# Patient Record
Sex: MALE | Race: WHITE | NOT HISPANIC OR LATINO
[De-identification: names, ages, dates, MRNs, and addresses within clinical notes are randomized per-mention and may not be internally consistent; named-entity substitution may affect disease eponyms.]

---

## 2018-01-22 PROBLEM — Z00.00 ENCOUNTER FOR PREVENTIVE HEALTH EXAMINATION: Status: ACTIVE | Noted: 2018-01-22

## 2018-01-23 ENCOUNTER — APPOINTMENT (OUTPATIENT)
Dept: UROLOGY | Facility: CLINIC | Age: 43
End: 2018-01-23
Payer: MEDICAID

## 2018-01-23 VITALS
BODY MASS INDEX: 21.7 KG/M2 | TEMPERATURE: 97.9 F | SYSTOLIC BLOOD PRESSURE: 123 MMHG | HEART RATE: 77 BPM | WEIGHT: 155 LBS | DIASTOLIC BLOOD PRESSURE: 83 MMHG | OXYGEN SATURATION: 95 % | HEIGHT: 71 IN

## 2018-01-23 DIAGNOSIS — Z87.828 PERSONAL HISTORY OF OTHER (HEALED) PHYSICAL INJURY AND TRAUMA: ICD-10-CM

## 2018-01-23 PROCEDURE — 81003 URINALYSIS AUTO W/O SCOPE: CPT | Mod: QW

## 2018-01-23 PROCEDURE — 99203 OFFICE O/P NEW LOW 30 MIN: CPT

## 2018-01-23 RX ORDER — ELECTROLYTES/DEXTROSE
10 SOLUTION, ORAL ORAL
Refills: 0 | Status: ACTIVE | COMMUNITY

## 2018-01-23 RX ORDER — PAROXETINE HYDROCHLORIDE 20 MG/1
20 TABLET, FILM COATED ORAL
Qty: 30 | Refills: 0 | Status: COMPLETED | COMMUNITY
Start: 2017-11-29 | End: 2018-01-23

## 2018-01-23 RX ORDER — IBUPROFEN 600 MG/1
600 TABLET, FILM COATED ORAL
Qty: 30 | Refills: 0 | Status: COMPLETED | COMMUNITY
Start: 2018-01-19 | End: 2018-01-23

## 2018-01-25 LAB
BILIRUB UR QL STRIP: NORMAL
CLARITY UR: CLEAR
COLLECTION METHOD: NORMAL
GLUCOSE UR-MCNC: NORMAL
HCG UR QL: 1 EU/DL
HGB UR QL STRIP.AUTO: NORMAL
KETONES UR-MCNC: NORMAL
LEUKOCYTE ESTERASE UR QL STRIP: NORMAL
NITRITE UR QL STRIP: NORMAL
PH UR STRIP: 6
PROT UR STRIP-MCNC: NORMAL
SP GR UR STRIP: 1.01

## 2018-01-30 ENCOUNTER — APPOINTMENT (OUTPATIENT)
Dept: UROLOGY | Facility: CLINIC | Age: 43
End: 2018-01-30
Payer: MEDICAID

## 2018-01-30 VITALS
TEMPERATURE: 98 F | BODY MASS INDEX: 21.7 KG/M2 | HEIGHT: 71 IN | SYSTOLIC BLOOD PRESSURE: 109 MMHG | WEIGHT: 155 LBS | HEART RATE: 74 BPM | DIASTOLIC BLOOD PRESSURE: 73 MMHG | OXYGEN SATURATION: 96 %

## 2018-01-30 DIAGNOSIS — N20.0 CALCULUS OF KIDNEY: ICD-10-CM

## 2018-01-30 LAB
BILIRUB UR QL STRIP: NORMAL
CLARITY UR: NORMAL
COLLECTION METHOD: NORMAL
GLUCOSE UR-MCNC: NORMAL
HCG UR QL: 0.2 EU/DL
HGB UR QL STRIP.AUTO: NORMAL
KETONES UR-MCNC: NORMAL
LEUKOCYTE ESTERASE UR QL STRIP: NORMAL
NITRITE UR QL STRIP: NORMAL
PH UR STRIP: 5.5
PROT UR STRIP-MCNC: NORMAL
SP GR UR STRIP: 1.01

## 2018-01-30 PROCEDURE — 81003 URINALYSIS AUTO W/O SCOPE: CPT | Mod: QW

## 2018-01-30 PROCEDURE — 99212 OFFICE O/P EST SF 10 MIN: CPT

## 2018-01-30 RX ORDER — TRAMADOL HYDROCHLORIDE 50 MG/1
50 TABLET, COATED ORAL
Qty: 20 | Refills: 0 | Status: COMPLETED | COMMUNITY
Start: 2018-01-20 | End: 2018-01-30

## 2018-01-30 RX ORDER — POLYETHYLENE GLYCOL 3350 17 G/17G
17 POWDER, FOR SOLUTION ORAL
Qty: 7 | Refills: 0 | Status: COMPLETED | COMMUNITY
Start: 2018-01-19 | End: 2018-01-30

## 2018-01-30 RX ORDER — TAMSULOSIN HYDROCHLORIDE 0.4 MG/1
0.4 CAPSULE ORAL
Qty: 30 | Refills: 0 | Status: COMPLETED | COMMUNITY
Start: 2018-01-23 | End: 2018-01-30

## 2018-01-30 RX ORDER — TRAMADOL HYDROCHLORIDE 50 MG/1
50 TABLET, COATED ORAL
Refills: 0 | Status: COMPLETED | COMMUNITY
End: 2018-01-30

## 2018-01-31 PROBLEM — N20.0 KIDNEY STONE ON RIGHT SIDE: Status: ACTIVE | Noted: 2018-01-23

## 2021-10-12 ENCOUNTER — EMERGENCY (EMERGENCY)
Facility: HOSPITAL | Age: 46
LOS: 1 days | End: 2021-10-12
Admitting: EMERGENCY MEDICINE
Payer: MEDICAID

## 2021-10-12 PROCEDURE — 74177 CT ABD & PELVIS W/CONTRAST: CPT | Mod: 26

## 2021-10-12 PROCEDURE — 71046 X-RAY EXAM CHEST 2 VIEWS: CPT | Mod: 26

## 2021-10-12 PROCEDURE — 99285 EMERGENCY DEPT VISIT HI MDM: CPT

## 2021-10-12 PROCEDURE — 70450 CT HEAD/BRAIN W/O DYE: CPT | Mod: 26

## 2021-10-12 PROCEDURE — 72125 CT NECK SPINE W/O DYE: CPT | Mod: 26

## 2022-12-20 ENCOUNTER — APPOINTMENT (OUTPATIENT)
Dept: CARDIOLOGY | Facility: CLINIC | Age: 47
End: 2022-12-20
Payer: COMMERCIAL

## 2022-12-20 VITALS
HEART RATE: 82 BPM | BODY MASS INDEX: 24.78 KG/M2 | DIASTOLIC BLOOD PRESSURE: 70 MMHG | OXYGEN SATURATION: 98 % | HEIGHT: 71 IN | SYSTOLIC BLOOD PRESSURE: 118 MMHG | RESPIRATION RATE: 16 BRPM | WEIGHT: 177 LBS

## 2022-12-20 VITALS — DIASTOLIC BLOOD PRESSURE: 66 MMHG | SYSTOLIC BLOOD PRESSURE: 112 MMHG

## 2022-12-20 DIAGNOSIS — Z87.01 PERSONAL HISTORY OF PNEUMONIA (RECURRENT): ICD-10-CM

## 2022-12-20 DIAGNOSIS — R53.83 OTHER FATIGUE: ICD-10-CM

## 2022-12-20 DIAGNOSIS — Z98.890 OTHER SPECIFIED POSTPROCEDURAL STATES: ICD-10-CM

## 2022-12-20 PROCEDURE — 99204 OFFICE O/P NEW MOD 45 MIN: CPT | Mod: 25

## 2022-12-20 PROCEDURE — 93000 ELECTROCARDIOGRAM COMPLETE: CPT

## 2022-12-20 PROCEDURE — 93246 EXT ECG>7D<15D RECORDING: CPT | Mod: 59

## 2022-12-20 RX ORDER — ASPIRIN ENTERIC COATED TABLETS 81 MG 81 MG/1
81 TABLET, DELAYED RELEASE ORAL
Qty: 90 | Refills: 3 | Status: ACTIVE | COMMUNITY

## 2022-12-20 RX ORDER — ATORVASTATIN CALCIUM 20 MG/1
20 TABLET, FILM COATED ORAL
Refills: 0 | Status: DISCONTINUED | COMMUNITY
End: 2022-12-20

## 2022-12-20 NOTE — DISCUSSION/SUMMARY
[FreeTextEntry1] : 1. Dyspnea: patient states he had echocardiogram done recently with PCP. Will attempt to obtain report. Recommend proBNP level. Given history of CAD, recommend repeating left heart catheterization to see if stenosis progressed, especially given the fact that patient has been off his medications.\par \par 2. CAD: plan as above. Back on rosuvastatin 10mg daily and Aspirin 81mg daily. Discussed with patient the lifelong indication for these medications and the likelihood of me increasing the rosuvastatin in the future as his goal LDL will be less than 70.\par \par 3. HLD: goal LDL less than 70. Discussed the familial nature to his condition. Patient has two daughters. Discussed with  and wife that his daughters should be screened as well. For now continue rosuvastatin 10mg daily.\par \par 4. Syncope: recommend testing as above. Recommend 14 Day Zio. Patient should remain out of work until workup is complete. \par \par Follow up after testing. [EKG obtained to assist in diagnosis and management of assessed problem(s)] : EKG obtained to assist in diagnosis and management of assessed problem(s)

## 2022-12-20 NOTE — REVIEW OF SYSTEMS
[Feeling Fatigued] : feeling fatigued [Negative] : Neurological [FreeTextEntry5] : see HPI [FreeTextEntry6] : see HPI

## 2022-12-20 NOTE — PHYSICAL EXAM
[Normal] : moves all extremities, no focal deficits, normal speech [de-identified] : No carotid bruits auscultated bilaterally

## 2022-12-20 NOTE — HISTORY OF PRESENT ILLNESS
[FreeTextEntry1] : 47 year old male, former smoke, PMHx of familial hypercholesterolemia, CAD, syncope presents for a cardiac evaluation. Patient states about two months ago he got pneumonia. Was treated. Since then he has dyspnea on exertion. Feels like he can't talk without getting dyspnea. No chest pain or pressure. \par \saranya Patient is a former patient of Dr. Back, [facility] [place]. He states he had two cardiac catheterizations. One in 2019 and most recently summer of 2022. Patient states he has a 60-70% stenosis in an unknown vessel. It was not intervened on. Patient states he didn't feel comfortable with his previous cardiologist and wants to get a second opinion, especially given the ongoing dyspnea. \par \par Patient also states he has had two syncopal episodes over the past 1 year. Feels it coming on. Feels tunnel vision and lightheadedness.\par \par Patient was taken off his statin and Aspirin by his previous cardiologist to see if his symptoms were secondary to side effects, however, he never felt better and never put back on. Only recently placed back on Aspirin 81mg daily and rosuvastatin 10mg daily by PCP when labs from December 2022, demonstrated LDL of 200.\par \par Patient is adopted and doesn't know his biological parents history.\par \par Patient has 2 daughter.\par \par Patient is a , but given his recent symptoms has been not working.

## 2023-01-06 ENCOUNTER — APPOINTMENT (OUTPATIENT)
Dept: CARDIOLOGY | Facility: CLINIC | Age: 48
End: 2023-01-06
Payer: COMMERCIAL

## 2023-01-06 VITALS
SYSTOLIC BLOOD PRESSURE: 118 MMHG | TEMPERATURE: 96.9 F | HEIGHT: 71 IN | OXYGEN SATURATION: 97 % | WEIGHT: 173 LBS | DIASTOLIC BLOOD PRESSURE: 70 MMHG | HEART RATE: 75 BPM | BODY MASS INDEX: 24.22 KG/M2

## 2023-01-06 DIAGNOSIS — Z87.891 PERSONAL HISTORY OF NICOTINE DEPENDENCE: ICD-10-CM

## 2023-01-06 PROCEDURE — 99214 OFFICE O/P EST MOD 30 MIN: CPT

## 2023-01-06 NOTE — HISTORY OF PRESENT ILLNESS
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  Found to have mid LAD myocardial bridge appears significant.  Moderate to severe diagonal disease which does not explain symptoms.  Upper normal LVEDP.  Radial access site well-healing.\par \par he is feeling better after beta blocker. \par \par Recommend to uptitrate lipid lowering therapy given CAD and .  Also medical treatment for mid LAD myocardial bridge with beta-blockade for symptomatic optimization.\par \par

## 2023-01-06 NOTE — DISCUSSION/SUMMARY
[FreeTextEntry1] : \par Found to have mid LAD myocardial bridge appears significant and could be a contributor to his symptoms.  Moderate to severe diagonal disease which does not explain symptoms.  Upper normal LVEDP. \par \par He is feeling much better after beta blocker. \par \par # Recommend to up-titrate lipid lowering therapy given CAD and . Also medical treatment for mid LAD myocardial bridge with beta-blockade for symptomatic optimization (if unable to tolerate then do amlodipine). Follow-up with primary cardiologist for further optimization.  ER precautions given to patient. \par \par Still out of work until work up complete per Dr. Dutta. Pending zio patch report. \par

## 2023-01-13 ENCOUNTER — APPOINTMENT (OUTPATIENT)
Dept: CARDIOLOGY | Facility: CLINIC | Age: 48
End: 2023-01-13
Payer: COMMERCIAL

## 2023-01-13 VITALS
HEIGHT: 71 IN | HEART RATE: 76 BPM | DIASTOLIC BLOOD PRESSURE: 82 MMHG | OXYGEN SATURATION: 97 % | SYSTOLIC BLOOD PRESSURE: 126 MMHG | WEIGHT: 175 LBS | BODY MASS INDEX: 24.5 KG/M2

## 2023-01-13 DIAGNOSIS — R06.02 SHORTNESS OF BREATH: ICD-10-CM

## 2023-01-13 DIAGNOSIS — R06.83 SNORING: ICD-10-CM

## 2023-01-13 PROCEDURE — 99214 OFFICE O/P EST MOD 30 MIN: CPT

## 2023-01-13 NOTE — CARDIOLOGY SUMMARY
[de-identified] : 12/8/2022, Sinus Bradycardia. [de-identified] : 12/20/2022, 14 Day Zio, NSR with three episodes of what appears to be Mobitz Type II (2 of episodes patient was sleeping, and the third episode patient might have been sleeping), 4 beat NSVT (no AV godfrey blocking agents during monitoring period) [de-identified] : 12/10/2022, LV EF 68%, no significant valvular disease [de-identified] : 1/3/2023, mid LAD bridge, moderate diagonal disease (60%)

## 2023-01-13 NOTE — DISCUSSION/SUMMARY
[FreeTextEntry1] : 1. Myocardial Bridge: evident on cardiac catheterization from 1/3/2023, but not likely playing a role in patient' symptoms. Recommend restarting Toprol X 25mg daily, however, as patient states he felt better on the medication. \par \par 2. CAD: 60% diagnonal lesion evident on cardiac catheterization from 1/3/2023. Recommend increasing rosuvastatin to 40mg daily. Continue Aspirin 81mg daily. Restart Toprol XL 25mg daily. Goal LDL less than 70. \par \par 3. HLD: goal LDL less than 70. Discussed the familial nature to his condition. Patient has two daughters. Discussed with  and wife that his daughters should be screened as well. Increase rosuvastatin to 40mg daily. Goal LDL less than 70 given underlying CAD. \par \par 4. Syncope: I don't believe the myocardial bridge or his CAD to be a culprit. Wore a 14 day Zio and it showed 3 separate Mobitz Type II blocks. This happened when patient was sleeping and he was not on AV godfrey blocking agents during the monitoring period. I don't think this has played a role in his syncopal episodes as well, however, patient does need to be on beta blocker therapy going forward, so I am going to repeat the monitor on beta blocker therapy. He will come in next Tuesday to have it placed. Also, given that he is a , I am referring him to EP for further evaluation. Wife also states he snores, so I am ordering a home sleep study.\par \par Follow up in 6 months. \par \par \par

## 2023-01-13 NOTE — HISTORY OF PRESENT ILLNESS
[FreeTextEntry1] : 47 year old male, former smoke, PMHx of familial hypercholesterolemia, CAD, syncope presents for a cardiac evaluation. Patient states about two months ago he got pneumonia. Was treated. Since then he has dyspnea on exertion. Feels like he can't talk without getting dyspnea. No chest pain or pressure. \par \saranya Patient is a former patient of Dr. Back, [facility] [place]. He states he had two cardiac catheterizations. One in 2019 and most recently summer of 2022. Patient states he has a 60-70% stenosis in an unknown vessel. It was not intervened on. Patient states he didn't feel comfortable with his previous cardiologist and wants to get a second opinion, especially given the ongoing dyspnea. \par \par Patient also states he has had two syncopal episodes over the past 1 year. Feels it coming on. Feels tunnel vision and lightheadedness.\par \par Patient was taken off his statin and Aspirin by his previous cardiologist to see if his symptoms were secondary to side effects, however, he never felt better and never put back on. Only recently placed back on Aspirin 81mg daily and rosuvastatin 10mg daily by PCP when labs from December 2022, demonstrated LDL of 200.\par \par Patient is adopted and doesn't know his biological parents history.\par \par Patient has 2 daughter.\par \par Patient is a , but given his recent symptoms has been not working.

## 2023-01-13 NOTE — PHYSICAL EXAM
[Normal] : moves all extremities, no focal deficits, normal speech [de-identified] : No carotid bruits auscultated bilaterally

## 2023-01-17 ENCOUNTER — APPOINTMENT (OUTPATIENT)
Dept: CARDIOLOGY | Facility: CLINIC | Age: 48
End: 2023-01-17
Payer: COMMERCIAL

## 2023-01-17 PROCEDURE — 93248 EXT ECG>7D<15D REV&INTERPJ: CPT

## 2023-01-31 ENCOUNTER — APPOINTMENT (OUTPATIENT)
Dept: CARDIOLOGY | Facility: CLINIC | Age: 48
End: 2023-01-31
Payer: COMMERCIAL

## 2023-01-31 PROCEDURE — 93244 EXT ECG>48HR<7D REV&INTERPJ: CPT

## 2023-02-03 ENCOUNTER — NON-APPOINTMENT (OUTPATIENT)
Age: 48
End: 2023-02-03

## 2023-02-22 ENCOUNTER — APPOINTMENT (OUTPATIENT)
Dept: ELECTROPHYSIOLOGY | Facility: CLINIC | Age: 48
End: 2023-02-22
Payer: COMMERCIAL

## 2023-02-22 ENCOUNTER — NON-APPOINTMENT (OUTPATIENT)
Age: 48
End: 2023-02-22

## 2023-02-22 PROCEDURE — 93000 ELECTROCARDIOGRAM COMPLETE: CPT

## 2023-02-22 PROCEDURE — 99204 OFFICE O/P NEW MOD 45 MIN: CPT | Mod: 25

## 2023-03-12 ENCOUNTER — TRANSCRIPTION ENCOUNTER (OUTPATIENT)
Age: 48
End: 2023-03-12

## 2023-03-12 ENCOUNTER — NON-APPOINTMENT (OUTPATIENT)
Age: 48
End: 2023-03-12

## 2023-03-13 ENCOUNTER — APPOINTMENT (OUTPATIENT)
Dept: PULMONOLOGY | Facility: CLINIC | Age: 48
End: 2023-03-13
Payer: COMMERCIAL

## 2023-03-13 VITALS
HEART RATE: 67 BPM | OXYGEN SATURATION: 97 % | BODY MASS INDEX: 25.8 KG/M2 | WEIGHT: 185 LBS | DIASTOLIC BLOOD PRESSURE: 72 MMHG | SYSTOLIC BLOOD PRESSURE: 118 MMHG | RESPIRATION RATE: 16 BRPM

## 2023-03-13 DIAGNOSIS — G47.33 OBSTRUCTIVE SLEEP APNEA (ADULT) (PEDIATRIC): ICD-10-CM

## 2023-03-13 DIAGNOSIS — G47.19 OTHER HYPERSOMNIA: ICD-10-CM

## 2023-03-13 PROCEDURE — 99204 OFFICE O/P NEW MOD 45 MIN: CPT

## 2023-03-13 RX ORDER — CYCLOBENZAPRINE HCL 5 MG
TABLET ORAL
Refills: 0 | Status: ACTIVE | COMMUNITY

## 2023-03-13 NOTE — HISTORY OF PRESENT ILLNESS
[TextBox_4] : FromThe patient is a 48-year-old male who has been complaining of his life of excessive daytime sleepiness.  He has been noted to snore.  He has a history of chest pains and has been undergoing cardiac work-up which showed myocardial bridging but also arrhythmias including pauses in the middle of the night.  He has been known to snore mildly.  He has very poor teeth and mostly has dentures.  He was sent for a sleep study recently and came to review that. [Obstructive Sleep Apnea] : obstructive sleep apnea [Home] : home [TextBox_100] : 1/23 [TextBox_108] : 6 [TextBox_112] : 99 [TextBox_116] : 88 [TextBox_120] : mod. snoring, hr 30-91 [ESS] : 11

## 2023-03-13 NOTE — PHYSICAL EXAM
[No Acute Distress] : no acute distress [Normal Oropharynx] : normal oropharynx [Low Lying Soft Palate] : low lying soft palate [IV] : Mallampati Class: IV [Normal Appearance] : normal appearance [No Neck Mass] : no neck mass [Normal Rate/Rhythm] : normal rate/rhythm [Normal S1, S2] : normal s1, s2 [No Murmurs] : no murmurs [No Resp Distress] : no resp distress [Clear to Auscultation Bilaterally] : clear to auscultation bilaterally [No Abnormalities] : no abnormalities [Benign] : benign [Normal Gait] : normal gait [No Clubbing] : no clubbing [No Cyanosis] : no cyanosis [No Edema] : no edema [FROM] : FROM [Normal Color/ Pigmentation] : normal color/ pigmentation [No Focal Deficits] : no focal deficits [Oriented x3] : oriented x3 [Normal Affect] : normal affect

## 2023-03-13 NOTE — ASSESSMENT
[FreeTextEntry1] : Patient shows mild obstructive sleep apnea compounded by excessive daytime sleepiness of a significant degree as well as nocturnal arrhythmias.  The home sleep study may be underestimating severity.  He is not a candidate for oral appliance therapy because of his dentition.  AutoPap at 4-12 cm H2O  was ordered for the patient and will be delivered to the patient's house.  Appropriate use parameters were discussed with the patient.  Patient will return after being on AutoPap for 6 to 8 weeks so that we can review compliance and efficacy and address any problems the patient may have with AutoPAP.

## 2023-03-15 ENCOUNTER — NON-APPOINTMENT (OUTPATIENT)
Age: 48
End: 2023-03-15

## 2023-03-16 NOTE — DISCUSSION/SUMMARY
[EKG obtained to assist in diagnosis and management of assessed problem(s)] : EKG obtained to assist in diagnosis and management of assessed problem(s) [FreeTextEntry1] : These episodes by his description appears to be vasovagal in nature seem to occur in situations if either increased stress or situations of discomfort such as neck pain.  Most likely he has a drop in blood pressure that then results in the syncope episode.  Because of the findings on the monitor and the nature of his occupation I would recommend the patient get an implantable loop monitor.\par \par I discussed the findings with the patient and his spouse.  I feel that this most likely is vasovagal but we would need to record his heartbeat during episodes and the best possible way would be an implantable loop monitor.  I discussed the procedure with the patient and he seems agreeable and we will schedule it.\par \par The ILR procedure, risk, benefits, alternatives ( including external monitors), monitoring limitations all explained. Patient understands and wants to proceed.\par

## 2023-03-16 NOTE — PHYSICAL EXAM
[No Acute Distress] : no acute distress [Normal Venous Pressure] : normal venous pressure [Normal S1, S2] : normal S1, S2 [Clear Lung Fields] : clear lung fields [Non Tender] : non-tender [No Edema] : no edema [No Focal Deficits] : no focal deficits [Alert and Oriented] : alert and oriented

## 2023-03-16 NOTE — HISTORY OF PRESENT ILLNESS
After Your Colonoscopy Instructions    DIET:  · Resume your usual diet as able  · Nausea may be expected for the first 24 to 48 hours.  Start eating a bland diet (toast, gelatin, 7-up, hot cereal, crackers, sherbet, broth and soup).  · Drink plenty of fluids (6-8 glasses of water a day).  · Avoid greasy or spicy foods for 24 hours.    ACTIVITY:  · Rest quietly at home for the remainder of the day. You may resume normal activities tomorrow.  · Do not do anything that requires coordination the day of the procedure, such as climbing ladders, using a sharp knife, operating machinery, driving.   · May resume driving and/or operating machinery in 24 hours  · May Shower tomorrow  · Follow for your well being safety (given & explained)  · Follow for your well being care after anesthesia or sedation (given & explained)       WHAT TO EXPECT:  · Mild abdominal discomfort, bloating and gas pains.  · A scant amount of rectal bleeding following a biopsy, polypectomy or if you have hemorrhoids is normal.  · Return of your usual bowel movements in 1-2 days.  · A tired feeling after the procedure due to the medications given to help you relax.       PROBLEMS TO WATCH FOR - NOTIFY YOUR SURGEON IF YOU HAVE ANY OF THESE SYMPTOMS:  · Severe abdominal pain or bloating.   · Signs of infection including chills or temperature over 101 degrees.  · Large amounts of bright red rectal bleeding, blood clots or black colored stool.  · Vomiting blood or black colored liquid.    FOLLOW -UP:  · If a specimen was taken, please allow at least 7-10  business days for pathology results.  · Someone will either call or send a letter with your pathology results.      If you have not received a letter or phone call, or have any questions or concerns please call DR. Howe [phone]                                 After Your EGD (Esophagogastroduodenoscopy) Instructions    DIET:  · Resume your usual diet as able  · Nausea may be  expected for the first 24 to 48 hours.  Start eating a bland diet (toast, gelatin, 7-up, hot cereal, crackers, sherbet, broth and soup).  · Drink plenty of fluids (6-8 glasses of water a day).  · Avoid greasy or spicy foods for 24 hours.    ACTIVITY:  · Rest quietly at home for the remainder of the day. You may resume normal activities tomorrow.  · Do not do anything that requires coordination the day of the procedure, such as climbing ladders, using a sharp knife, operating machinery, driving.  · May resume driving and/or operating machinery in 24 hours  · May shower tomorrow   · Follow post anesthesia sedation information sheet.(given and explained)  · Follow For Your Well Being Safety (given and explained)       WHAT TO EXPECT:  · Mild abdominal discomfort, bloating and gas pains.  · Mild throat soreness.  Warm salt-water gargle or lozenges may relieve your discomfort.  · Return of your usual bowel movements in 1-2 days.  · A tired feeling after the procedure due to the medications given to help you relax.       PROBLEMS TO WATCH FOR - NOTIFY YOUR SURGEON IF YOU HAVE ANY OF THESE SYMPTOMS:  · Severe abdominal pain or bloating.  · Difficulty swallowing or breathing  · Neck swelling  · Excessive pain   · Signs of infection including chills or temperature over 101 degrees..  · Large amounts of bright red rectal bleeding, blood clots or black colored stool.  · Nausea or Vomiting of blood or black colored liquid    FOLLOW -UP:  · Specimens were taken, please allow at least 7-10  business days for pathology results.  · Someone will either call or send a letter with your pathology results.      If you have not received a letter or phone call, or have any question or concerns,  please call DR. Howe [phone]    Patient/Family given For Your Well-Being Teaching Sheet:       x     Partners in Safety                        x    Care After Anesthesia/Sedation                   [FreeTextEntry1] : Patient is a 48-year-old man who was referred by his cardiologist for evaluation because of prior syncope and bradycardia noted on his monitoring.  He was accompanied by his spouse.\par \par He has a history of familial hypercholesterolemia.  Patient had previous cardiac catheterization at [facility] in 2019 ,2022\par \par Patient had another cardiac catheterization at [facility] on 1/3/2023 that showed minor irregularities in the left main, 60% stenosis of the first diagonal circumflex minor irregularities and RCA minor irregularities circumflex\par \par The patient had an echocardiogram performed 12/2020 that showed preserved left ventricular function normal left atrial size normal right ventricular systolic function as well normal wall motion and thickness no significant valvular abnormality.\par \par He had a Zio patch monitor performed for 3 days starting 1/17/2023 that showed sinus rhythm with short episodes 4 beats of ventricular rhythm at 130 bpm, occasional APC, PVC, sinus tachycardia 132 bpm\par \par He had another monitor that was performed for 14 days 12/20/2022: It showed sinus rhythm, 4 beats of NSVT, second-degree AV block and type I AV block -seem to have occurred during his sleep an hour.\par \par The patient has had about 4 syncope episode in 2022.\par \par He has had a prior history of sleep apnea and repeat tests pending.\par \par He works as a \par \par He has had prior cervical, knee and back surgery.  He has had recent pneumonia as well.\par \par Description of the previous syncope episode : Last episode occurred on a very strict stressful situation with family issues involving death in the family.  He felt pressure in his chest felt dizzy and then passed out.  He went to the hospital.  The episode before that he was at the physician's office doing blood test when he had his episode.\par \par

## 2023-03-16 NOTE — REVIEW OF SYSTEMS
[Headache] : no headache [Blurry Vision] : no blurred vision [SOB] : no shortness of breath [Dyspnea on exertion] : not dyspnea during exertion [Chest Discomfort] : no chest discomfort [Palpitations] : no palpitations [Syncope] : syncope [Cough] : no cough [Abdominal Pain] : no abdominal pain [Dizziness] : no dizziness [Under Stress] : not under stress [Easy Bleeding] : no tendency for easy bleeding

## 2023-03-20 ENCOUNTER — APPOINTMENT (OUTPATIENT)
Dept: CARDIOLOGY | Facility: CLINIC | Age: 48
End: 2023-03-20
Payer: COMMERCIAL

## 2023-03-20 PROCEDURE — ZZZZZ: CPT

## 2023-04-07 ENCOUNTER — APPOINTMENT (OUTPATIENT)
Dept: CARDIOLOGY | Facility: CLINIC | Age: 48
End: 2023-04-07
Payer: COMMERCIAL

## 2023-04-07 VITALS
OXYGEN SATURATION: 97 % | WEIGHT: 180 LBS | BODY MASS INDEX: 25.2 KG/M2 | DIASTOLIC BLOOD PRESSURE: 62 MMHG | HEART RATE: 66 BPM | HEIGHT: 71 IN | SYSTOLIC BLOOD PRESSURE: 114 MMHG

## 2023-04-07 DIAGNOSIS — R55 SYNCOPE AND COLLAPSE: ICD-10-CM

## 2023-04-07 DIAGNOSIS — Q24.5 MALFORMATION OF CORONARY VESSELS: ICD-10-CM

## 2023-04-07 PROCEDURE — 99214 OFFICE O/P EST MOD 30 MIN: CPT

## 2023-04-07 RX ORDER — DIPHENHYDRAMINE HCL, IBUPROFEN 25; 200 MG/1; MG/1
CAPSULE, LIQUID FILLED ORAL
Refills: 0 | Status: ACTIVE | COMMUNITY

## 2023-04-07 NOTE — PHYSICAL EXAM
[Normal] : moves all extremities, no focal deficits, normal speech [de-identified] : No carotid bruits auscultated bilaterally

## 2023-04-07 NOTE — CARDIOLOGY SUMMARY
[de-identified] : 12/8/2022, Sinus Bradycardia. [de-identified] : 12/20/2022, 14 Day Zio, NSR with three episodes of what appears to be Mobitz Type II (2 of episodes patient was sleeping, and the third episode patient might have been sleeping), 4 beat NSVT (no AV godfrey blocking agents during monitoring period) [de-identified] : 12/10/2022, LV EF 68%, no significant valvular disease [de-identified] : 1/3/2023, mid LAD bridge, moderate diagonal disease (60%)

## 2023-04-07 NOTE — DISCUSSION/SUMMARY
[FreeTextEntry1] : 1. Myocardial Bridge: evident on cardiac catheterization from 1/3/2023, but not likely playing a role in patient' symptoms. Recommend continuing Toprol X 25mg daily.\par \par 2. CAD: 60% diagnonal lesion evident on cardiac catheterization from 1/3/2023. Recommend increasing rosuvastatin to 40mg daily. Continue Aspirin 81mg daily. Continue Toprol XL 25mg daily. Goal LDL less than 70. \par \par 3. HLD: goal LDL less than 70. Discussed the familial nature to his condition. Patient has two daughters. Discussed with  and wife that his daughters should be screened as well. Continue rosuvastatin 40mg daily. Goal LDL less than 70 given underlying CAD. \par \par 4. Syncope: I don't believe the myocardial bridge or his CAD to be a culprit. Wore a 14 day Zio and it showed 3 separate Mobitz Type I blocks. This happened when patient was sleeping and he was not on AV godfrey blocking agents during the monitoring period. I don't think this has played a role in his syncopal episodes as well, however, patient does need to be on beta blocker therapy going forward, so I referred him to EP for further evaluation. Since seeing me last patient saw Dr. Fisher in February 2023. He thought the syncopal episodes were vagally mediated, but given patient's occupation he recommended an ILR. Insurance denied it and recommended another Zio monitor which patient just mailed in so results are not available.\par \par Follow up in 6 months. \par \par \par

## 2023-04-07 NOTE — HISTORY OF PRESENT ILLNESS
[FreeTextEntry1] : 48 year old male, former smoke, PMHx of familial hypercholesterolemia, CAD, syncope presents for a cardiac evaluation. Patient states about two months ago he got pneumonia. Was treated. Since then he has dyspnea on exertion. Feels like he can't talk without getting dyspnea. No chest pain or pressure. \par \par Patient is a former patient of [place], [facility] [place] He states he had two cardiac catheterizations. One in 2019 and most recently summer of 2022. Patient states he has a 60-70% stenosis in an unknown vessel. It was not intervened on. Patient states he didn't feel comfortable with his previous cardiologist and wants to get a second opinion, especially given the ongoing dyspnea. \par \par Patient also states he has had two syncopal episodes over the past 1 year. Feels it coming on. Feels tunnel vision and lightheadedness.\par \par Patient was taken off his statin and Aspirin by his previous cardiologist to see if his symptoms were secondary to side effects, however, he never felt better and never put back on. Only recently placed back on Aspirin 81mg daily and rosuvastatin 10mg daily by PCP when labs from December 2022, demonstrated LDL of 200.\par \par Patient is adopted and doesn't know his biological parents history.\par \par Patient has 2 daughter.\par \par Patient is a , but given his recent symptoms has been not working.\par \par Current Health Status:\par Since seeing me last patient saw Dr. Fisher in February 2023. He thought the syncopal episodes were vagally mediated, but given patient's occupation he recommended an ILR. Insurance denied it and recommended another Zio monitor which patient just mailed in so results are not available. Patient also saw Dr. Rodarte and was prescribed AutoPAP, which he has not obtained yet. Patient is overall feeling well. He has no chest pain or dyspnea. No more syncope. Still with fatigue throughout the day.

## 2023-04-11 ENCOUNTER — APPOINTMENT (OUTPATIENT)
Dept: CARDIOLOGY | Facility: CLINIC | Age: 48
End: 2023-04-11
Payer: COMMERCIAL

## 2023-04-11 PROCEDURE — 93248 EXT ECG>7D<15D REV&INTERPJ: CPT

## 2023-04-12 ENCOUNTER — NON-APPOINTMENT (OUTPATIENT)
Age: 48
End: 2023-04-12

## 2023-04-20 ENCOUNTER — NON-APPOINTMENT (OUTPATIENT)
Age: 48
End: 2023-04-20

## 2023-05-10 ENCOUNTER — APPOINTMENT (OUTPATIENT)
Dept: CARDIOLOGY | Facility: CLINIC | Age: 48
End: 2023-05-10
Payer: COMMERCIAL

## 2023-05-10 VITALS
HEART RATE: 71 BPM | OXYGEN SATURATION: 97 % | BODY MASS INDEX: 25.9 KG/M2 | WEIGHT: 185 LBS | HEIGHT: 71 IN | SYSTOLIC BLOOD PRESSURE: 114 MMHG | DIASTOLIC BLOOD PRESSURE: 68 MMHG

## 2023-05-10 DIAGNOSIS — Z45.09 ENCOUNTER FOR ADJUSTMENT AND MANAGEMENT OF OTHER CARDIAC DEVICE: ICD-10-CM

## 2023-05-10 PROCEDURE — 93285 PRGRMG DEV EVAL SCRMS IP: CPT

## 2023-05-10 RX ORDER — ROSUVASTATIN CALCIUM 40 MG/1
40 TABLET, FILM COATED ORAL DAILY
Qty: 90 | Refills: 3 | Status: ACTIVE | COMMUNITY
Start: 1900-01-01 | End: 1900-01-01

## 2023-05-10 NOTE — PROCEDURE
[de-identified] : Battery status good [de-identified] : S/p ILR implant. Site healing well without s/s of infection.\par \par No events on device.\par \par PVC burden 0.1%\par \par Patient requesting clearance to return to work. He has been scheduled to see Dr. Dutta next available to be seen for a hospital follow up.\par \par Toprol and statin refilled. Patient having upcoming labs with PCP this week.\par \par Device confirmed in [employer] 5/10/23.\par \par F/U: Monthly DRCs and in office DVC 1 year.\par Discussed red flag symptoms, which would warrant sooner or emergent medical evaluation.\par Any questions and concerns were addressed and resolved.\par \par Sincerely,\par Dafne Enrique [facility]\par Patient's history, testing, and plan was reviewed with supervising physician, Dr. Patrick Valentino.

## 2023-05-17 ENCOUNTER — APPOINTMENT (OUTPATIENT)
Dept: CARDIOLOGY | Facility: CLINIC | Age: 48
End: 2023-05-17

## 2023-05-23 ENCOUNTER — APPOINTMENT (OUTPATIENT)
Dept: CARDIOLOGY | Facility: CLINIC | Age: 48
End: 2023-05-23

## 2023-05-25 ENCOUNTER — NON-APPOINTMENT (OUTPATIENT)
Age: 48
End: 2023-05-25

## 2023-05-25 ENCOUNTER — APPOINTMENT (OUTPATIENT)
Dept: CARDIOLOGY | Facility: CLINIC | Age: 48
End: 2023-05-25
Payer: COMMERCIAL

## 2023-05-25 PROCEDURE — G2066: CPT

## 2023-05-25 PROCEDURE — 93298 REM INTERROG DEV EVAL SCRMS: CPT

## 2023-06-29 ENCOUNTER — APPOINTMENT (OUTPATIENT)
Dept: CARDIOLOGY | Facility: CLINIC | Age: 48
End: 2023-06-29
Payer: COMMERCIAL

## 2023-06-29 ENCOUNTER — NON-APPOINTMENT (OUTPATIENT)
Age: 48
End: 2023-06-29

## 2023-06-29 PROCEDURE — G2066: CPT

## 2023-06-29 PROCEDURE — 93298 REM INTERROG DEV EVAL SCRMS: CPT

## 2023-08-02 ENCOUNTER — NON-APPOINTMENT (OUTPATIENT)
Age: 48
End: 2023-08-02

## 2023-08-02 ENCOUNTER — APPOINTMENT (OUTPATIENT)
Dept: CARDIOLOGY | Facility: CLINIC | Age: 48
End: 2023-08-02
Payer: COMMERCIAL

## 2023-08-02 PROCEDURE — 93298 REM INTERROG DEV EVAL SCRMS: CPT

## 2023-08-02 PROCEDURE — G2066: CPT

## 2023-09-06 ENCOUNTER — NON-APPOINTMENT (OUTPATIENT)
Age: 48
End: 2023-09-06

## 2023-09-06 ENCOUNTER — APPOINTMENT (OUTPATIENT)
Dept: CARDIOLOGY | Facility: CLINIC | Age: 48
End: 2023-09-06
Payer: COMMERCIAL

## 2023-09-06 PROCEDURE — G2066: CPT

## 2023-09-06 PROCEDURE — 93298 REM INTERROG DEV EVAL SCRMS: CPT

## 2023-10-09 ENCOUNTER — APPOINTMENT (OUTPATIENT)
Dept: CARDIOLOGY | Facility: CLINIC | Age: 48
End: 2023-10-09

## 2023-10-11 ENCOUNTER — NON-APPOINTMENT (OUTPATIENT)
Age: 48
End: 2023-10-11

## 2023-10-11 ENCOUNTER — APPOINTMENT (OUTPATIENT)
Dept: CARDIOLOGY | Facility: CLINIC | Age: 48
End: 2023-10-11
Payer: COMMERCIAL

## 2023-10-12 PROCEDURE — 93298 REM INTERROG DEV EVAL SCRMS: CPT

## 2023-10-12 PROCEDURE — G2066: CPT

## 2023-11-15 ENCOUNTER — APPOINTMENT (OUTPATIENT)
Dept: CARDIOLOGY | Facility: CLINIC | Age: 48
End: 2023-11-15
Payer: COMMERCIAL

## 2023-11-15 ENCOUNTER — NON-APPOINTMENT (OUTPATIENT)
Age: 48
End: 2023-11-15

## 2023-11-16 PROCEDURE — G2066: CPT

## 2023-11-16 PROCEDURE — 93298 REM INTERROG DEV EVAL SCRMS: CPT

## 2023-12-19 ENCOUNTER — NON-APPOINTMENT (OUTPATIENT)
Age: 48
End: 2023-12-19

## 2023-12-19 ENCOUNTER — APPOINTMENT (OUTPATIENT)
Dept: CARDIOLOGY | Facility: CLINIC | Age: 48
End: 2023-12-19
Payer: COMMERCIAL

## 2023-12-20 PROCEDURE — 93298 REM INTERROG DEV EVAL SCRMS: CPT

## 2023-12-20 PROCEDURE — G2066: CPT

## 2024-01-16 ENCOUNTER — NON-APPOINTMENT (OUTPATIENT)
Age: 49
End: 2024-01-16

## 2024-01-23 ENCOUNTER — APPOINTMENT (OUTPATIENT)
Dept: CARDIOLOGY | Facility: CLINIC | Age: 49
End: 2024-01-23
Payer: COMMERCIAL

## 2024-01-23 ENCOUNTER — NON-APPOINTMENT (OUTPATIENT)
Age: 49
End: 2024-01-23

## 2024-01-23 PROCEDURE — 93298 REM INTERROG DEV EVAL SCRMS: CPT

## 2024-02-27 ENCOUNTER — APPOINTMENT (OUTPATIENT)
Dept: CARDIOLOGY | Facility: CLINIC | Age: 49
End: 2024-02-27
Payer: COMMERCIAL

## 2024-02-27 ENCOUNTER — NON-APPOINTMENT (OUTPATIENT)
Age: 49
End: 2024-02-27

## 2024-02-27 PROCEDURE — 93298 REM INTERROG DEV EVAL SCRMS: CPT

## 2024-04-01 ENCOUNTER — APPOINTMENT (OUTPATIENT)
Dept: CARDIOLOGY | Facility: CLINIC | Age: 49
End: 2024-04-01
Payer: COMMERCIAL

## 2024-04-01 ENCOUNTER — NON-APPOINTMENT (OUTPATIENT)
Age: 49
End: 2024-04-01

## 2024-04-01 PROCEDURE — 93298 REM INTERROG DEV EVAL SCRMS: CPT

## 2024-05-06 ENCOUNTER — APPOINTMENT (OUTPATIENT)
Dept: CARDIOLOGY | Facility: CLINIC | Age: 49
End: 2024-05-06
Payer: COMMERCIAL

## 2024-05-06 ENCOUNTER — NON-APPOINTMENT (OUTPATIENT)
Age: 49
End: 2024-05-06

## 2024-05-06 PROCEDURE — 93298 REM INTERROG DEV EVAL SCRMS: CPT

## 2024-05-14 ENCOUNTER — APPOINTMENT (OUTPATIENT)
Dept: NEUROSURGERY | Facility: CLINIC | Age: 49
End: 2024-05-14
Payer: COMMERCIAL

## 2024-05-14 DIAGNOSIS — M48.02 SPINAL STENOSIS, CERVICAL REGION: ICD-10-CM

## 2024-05-14 DIAGNOSIS — Z98.1 ARTHRODESIS STATUS: ICD-10-CM

## 2024-05-14 DIAGNOSIS — R29.898 OTHER SYMPTOMS AND SIGNS INVOLVING THE MUSCULOSKELETAL SYSTEM: ICD-10-CM

## 2024-05-14 PROCEDURE — 99204 OFFICE O/P NEW MOD 45 MIN: CPT

## 2024-05-14 NOTE — DATA REVIEWED
[de-identified] : And CT scan of the was reviewed of the cervical spine ([facility] [place], disc returned to the patient) - evidence of previous C6-7 ACDF; multilevel degenerative changes with foraminal narrowing at C5-6 and C7-T1.

## 2024-05-14 NOTE — HISTORY OF PRESENT ILLNESS
[FreeTextEntry1] : 49-year-old male presents to the neurosurgery office accompanied by his wife for an initial office visit for evaluation of neck pain and upper extremity radicular symptoms.  The patient reports previous history with Dr. Liz schafer in 2010 where he underwent elective lumbar fusion.  The patient reports that he later underwent surgery (ACDF C6-7) by Dr. Wood in 2021 which was performed on an emergent basis.  The patient has positive seizure history and is currently seeing Dr. Vidales who has the patient on lamotrigine and reports not having a seizure since June 2023.  He is here today for evaluation of his cervical spine and does have CT and MRI imaging of his cervical spine available for review.  He does have issues with coordination and balance issues and reports upper extremity weakness with noted changes in his  strength.  The patient is right-hand dominant.  He has undergone conservative treatment with physical therapy but has not gone to pain management recently.  The patient had a recent fall down the stairs with increased pain and upper extremity radicular symptoms warranting a trip to [facility] where [place] and MRI imaging was obtained and reviewed.  The patient was referred back to Dr. Wood's office, however this doctor does not take his insurance.

## 2024-05-14 NOTE — ASSESSMENT
[FreeTextEntry1] : Discussed the history, physical examination findings, and recent MRI and CT imaging with the patient and his wife with all questions answered.  After review of the imaging along with the neurosurgery attending, there is no role for acute neurosurgical intervention for the noted spine findings.  The patient is encouraged to follow-up with his primary care doctor and neurology office.  A pain management referral has been provided.  Discussed that the patient may follow-up with our office as needed.

## 2024-05-14 NOTE — REASON FOR VISIT
[Follow-Up: _____] : a [unfilled] follow-up visit [FreeTextEntry1] : Pt 48 y/o M presents in office for a NPA,no other complaints

## 2024-05-14 NOTE — PHYSICAL EXAM
[General Appearance - In No Acute Distress] : in no acute distress [General Appearance - Well Nourished] : well nourished [General Appearance - Well Developed] : well developed [] : normal voice and communication [Oriented To Time, Place, And Person] : oriented to person, place, and time [Impaired Insight] : insight and judgment were intact [Affect] : the affect was normal [Mood] : the mood was normal [Memory Recent] : recent memory was not impaired [Memory Remote] : remote memory was not impaired [Person] : oriented to person [Place] : oriented to place [Time] : oriented to time [Limited Balance] : the patient's balance was impaired [Tremor] : a tremor present [FreeTextEntry1] : The patient ambulates unassisted

## 2024-05-28 ENCOUNTER — APPOINTMENT (OUTPATIENT)
Dept: CARDIOLOGY | Facility: CLINIC | Age: 49
End: 2024-05-28

## 2024-06-10 ENCOUNTER — NON-APPOINTMENT (OUTPATIENT)
Age: 49
End: 2024-06-10

## 2024-06-10 ENCOUNTER — APPOINTMENT (OUTPATIENT)
Dept: CARDIOLOGY | Facility: CLINIC | Age: 49
End: 2024-06-10
Payer: COMMERCIAL

## 2024-06-10 PROCEDURE — 93298 REM INTERROG DEV EVAL SCRMS: CPT

## 2024-06-12 ENCOUNTER — APPOINTMENT (OUTPATIENT)
Dept: CARDIOLOGY | Facility: CLINIC | Age: 49
End: 2024-06-12
Payer: COMMERCIAL

## 2024-06-12 ENCOUNTER — NON-APPOINTMENT (OUTPATIENT)
Age: 49
End: 2024-06-12

## 2024-06-12 VITALS
BODY MASS INDEX: 26.6 KG/M2 | HEIGHT: 71 IN | OXYGEN SATURATION: 97 % | DIASTOLIC BLOOD PRESSURE: 66 MMHG | HEART RATE: 66 BPM | SYSTOLIC BLOOD PRESSURE: 112 MMHG | WEIGHT: 190 LBS

## 2024-06-12 DIAGNOSIS — Z86.69 PERSONAL HISTORY OF OTHER DISEASES OF THE NERVOUS SYSTEM AND SENSE ORGANS: ICD-10-CM

## 2024-06-12 DIAGNOSIS — I44.1 ATRIOVENTRICULAR BLOCK, SECOND DEGREE: ICD-10-CM

## 2024-06-12 DIAGNOSIS — Z79.899 OTHER LONG TERM (CURRENT) DRUG THERAPY: ICD-10-CM

## 2024-06-12 DIAGNOSIS — E78.5 HYPERLIPIDEMIA, UNSPECIFIED: ICD-10-CM

## 2024-06-12 DIAGNOSIS — I25.10 ATHEROSCLEROTIC HEART DISEASE OF NATIVE CORONARY ARTERY W/OUT ANGINA PECTORIS: ICD-10-CM

## 2024-06-12 DIAGNOSIS — G40.909 EPILEPSY, UNSPECIFIED, NOT INTRACTABLE, W/OUT STATUS EPILEPTICUS: ICD-10-CM

## 2024-06-12 DIAGNOSIS — Z86.39 PERSONAL HISTORY OF OTHER ENDOCRINE, NUTRITIONAL AND METABOLIC DISEASE: ICD-10-CM

## 2024-06-12 PROCEDURE — G2211 COMPLEX E/M VISIT ADD ON: CPT

## 2024-06-12 PROCEDURE — 93000 ELECTROCARDIOGRAM COMPLETE: CPT

## 2024-06-12 PROCEDURE — 99215 OFFICE O/P EST HI 40 MIN: CPT

## 2024-06-12 RX ORDER — METOPROLOL SUCCINATE 25 MG/1
25 TABLET, EXTENDED RELEASE ORAL DAILY
Qty: 90 | Refills: 3 | Status: ACTIVE | COMMUNITY
Start: 2023-01-06 | End: 1900-01-01

## 2024-06-12 RX ORDER — LAMOTRIGINE 100 MG/1
100 TABLET ORAL TWICE DAILY
Refills: 0 | Status: ACTIVE | COMMUNITY

## 2024-06-12 RX ORDER — TRAZODONE HYDROCHLORIDE 100 MG/1
100 TABLET ORAL
Refills: 0 | Status: ACTIVE | COMMUNITY

## 2024-06-12 RX ORDER — FLUTICASONE PROPIONATE 50 UG/1
50 SPRAY, METERED NASAL
Refills: 0 | Status: ACTIVE | COMMUNITY

## 2024-06-12 NOTE — PHYSICAL EXAM
[Normal] : moves all extremities, no focal deficits, normal speech [de-identified] : No carotid bruits auscultated bilaterally

## 2024-06-12 NOTE — CARDIOLOGY SUMMARY
[de-identified] : 6/12/2024, SB, normal ECG 12/8/2022, Sinus Bradycardia. [de-identified] : 12/20/2022, 14 Day Zio, NSR with three episodes of what appears to be Mobitz Type II (2 of episodes patient was sleeping, and the third episode patient might have been sleeping), 4 beat NSVT (no AV godfrey blocking agents during monitoring period) [de-identified] : 12/10/2022, LV EF 68%, no significant valvular disease [de-identified] : 1/3/2023, mid LAD bridge, moderate diagonal disease (60%)

## 2024-06-12 NOTE — HISTORY OF PRESENT ILLNESS
[FreeTextEntry1] : Historical Perspective: 49 year old male, former smoke, PMHx of familial hypercholesterolemia, CAD, syncope presents for a cardiac evaluation. Patient states about two months ago he got pneumonia. Was treated. Since then he has dyspnea on exertion. Feels like he can't talk without getting dyspnea. No chest pain or pressure.   Patient is a former patient of Dr. Back, [facility] [place]. He states he had two cardiac catheterizations. One in 2019 and most recently summer of 2022. Patient states he has a 60-70% stenosis in an unknown vessel. It was not intervened on. Patient states he didn't feel comfortable with his previous cardiologist and wants to get a second opinion, especially given the ongoing dyspnea.   Patient also states he has had two syncopal episodes over the past 1 year. Feels it coming on. Feels tunnel vision and lightheadedness.  Patient was taken off his statin and Aspirin by his previous cardiologist to see if his symptoms were secondary to side effects, however, he never felt better and never put back on. Only recently placed back on Aspirin 81mg daily and rosuvastatin 10mg daily by PCP when labs from December 2022, demonstrated LDL of 200.  Patient is adopted and doesn't know his biological parents history.  Patient has 2 daughter.  Patient is a , but given his recent symptoms has been not working.  Patient saw Dr. Fisher in February 2023. He thought the syncopal episodes were vagally mediated, but given patient's occupation he recommended an ILR. Patient also saw Dr. Rodarte and was prescribed AutoPAP, which he has not obtained yet. Patient is overall feeling well. He has no chest pain or dyspnea. No more syncope. Still with fatigue throughout the day.   Current Health Status: Since seeing me last, patient had another neurological event around Mother's Day, 2023. Patient was taken to [facility] and it was determined it was in fact seizures. His ILR has been unremarkable. Patient is now seeing, neurologist, Dr. Vidales, and taking anti-seizure medications.  Patient needs clearance prior to right shoulder surgery at [facility], 6/27/2024. He has no chest pain, dyspnea or palpitations.

## 2024-06-12 NOTE — DISCUSSION/SUMMARY
[FreeTextEntry1] : 1. Myocardial Bridge: evident on cardiac catheterization from 1/3/2023, but not likely playing a role in patient' symptoms. Recommend continuing Toprol X 25mg daily.  2. CAD: 60% diagnonal lesion evident on cardiac catheterization from 1/3/2023. Recommend increasing rosuvastatin to 40mg daily. Continue Aspirin 81mg daily. Continue Toprol XL 25mg daily. Goal LDL less than 70.   3. HLD: goal LDL less than 70. Discussed the familial nature to his condition. Patient has two daughters. Discussed with  and wife that his daughters should be screened as well. Continue rosuvastatin 40mg daily. Goal LDL less than 70 given underlying CAD.   4. Syncope: I did not believe the myocardial bridge or his CAD to be a culprit. Wore a 14 day Zio and it showed 3 separate Mobitz Type I blocks. This happened when patient was sleeping and he was not on AV godfrey blocking agents during the monitoring period. I did not think this has played a role in his syncopal episodes as well, however, patient did need to be on beta blocker therapy going forward, so I referred him to EP for further evaluation. Patient saw Dr. Fisher in February 2023. He thought the syncopal episodes were vagally mediated, but given patient's occupation he recommended an ILR. ILR has showed no events. It turned out that patient's "syncopal episodes" were actually seizures. Following with Dr. Vidales now and patient on anti-seizure medications.   Now that we know it is was seizures causing the LOC episodes and there have been no events on his ILR, patient is interested in ILR explant. I told patient to have his shoulder surgery and recover. We can discuss further at next OV.  The patient has greater than 4 METs activity level without exertional complaints. There are no acute ECG changes, no murmur of aortic stenosis, and no clinical signs of heart failure. Patient is optimized from a cardiology standpoint to undergo the planned surgical procedure.  Follow up in 6 months.     [EKG obtained to assist in diagnosis and management of assessed problem(s)] : EKG obtained to assist in diagnosis and management of assessed problem(s)

## 2024-07-15 ENCOUNTER — NON-APPOINTMENT (OUTPATIENT)
Age: 49
End: 2024-07-15

## 2024-07-15 ENCOUNTER — APPOINTMENT (OUTPATIENT)
Dept: CARDIOLOGY | Facility: CLINIC | Age: 49
End: 2024-07-15
Payer: COMMERCIAL

## 2024-07-15 PROCEDURE — 93298 REM INTERROG DEV EVAL SCRMS: CPT

## 2024-08-19 ENCOUNTER — APPOINTMENT (OUTPATIENT)
Dept: CARDIOLOGY | Facility: CLINIC | Age: 49
End: 2024-08-19

## 2024-09-07 ENCOUNTER — RX RENEWAL (OUTPATIENT)
Age: 49
End: 2024-09-07

## 2024-09-23 ENCOUNTER — APPOINTMENT (OUTPATIENT)
Dept: CARDIOLOGY | Facility: CLINIC | Age: 49
End: 2024-09-23

## 2024-10-28 ENCOUNTER — APPOINTMENT (OUTPATIENT)
Dept: CARDIOLOGY | Facility: CLINIC | Age: 49
End: 2024-10-28
Payer: COMMERCIAL

## 2024-10-28 ENCOUNTER — NON-APPOINTMENT (OUTPATIENT)
Age: 49
End: 2024-10-28

## 2024-10-28 PROCEDURE — 93298 REM INTERROG DEV EVAL SCRMS: CPT

## 2024-12-02 ENCOUNTER — APPOINTMENT (OUTPATIENT)
Dept: CARDIOLOGY | Facility: CLINIC | Age: 49
End: 2024-12-02

## 2024-12-02 PROCEDURE — 93298 REM INTERROG DEV EVAL SCRMS: CPT

## 2024-12-11 ENCOUNTER — APPOINTMENT (OUTPATIENT)
Dept: CARDIOLOGY | Facility: CLINIC | Age: 49
End: 2024-12-11

## 2025-01-06 ENCOUNTER — NON-APPOINTMENT (OUTPATIENT)
Age: 50
End: 2025-01-06

## 2025-01-06 ENCOUNTER — APPOINTMENT (OUTPATIENT)
Dept: CARDIOLOGY | Facility: CLINIC | Age: 50
End: 2025-01-06
Payer: COMMERCIAL

## 2025-01-06 PROCEDURE — 93298 REM INTERROG DEV EVAL SCRMS: CPT

## 2025-02-10 ENCOUNTER — APPOINTMENT (OUTPATIENT)
Dept: CARDIOLOGY | Facility: CLINIC | Age: 50
End: 2025-02-10

## 2025-02-10 PROCEDURE — 93298 REM INTERROG DEV EVAL SCRMS: CPT

## 2025-03-17 ENCOUNTER — NON-APPOINTMENT (OUTPATIENT)
Age: 50
End: 2025-03-17

## 2025-03-17 ENCOUNTER — APPOINTMENT (OUTPATIENT)
Dept: CARDIOLOGY | Facility: CLINIC | Age: 50
End: 2025-03-17
Payer: COMMERCIAL

## 2025-03-17 PROCEDURE — 93298 REM INTERROG DEV EVAL SCRMS: CPT

## 2025-03-27 ENCOUNTER — APPOINTMENT (OUTPATIENT)
Dept: NEUROSURGERY | Facility: CLINIC | Age: 50
End: 2025-03-27
Payer: COMMERCIAL

## 2025-03-27 VITALS
DIASTOLIC BLOOD PRESSURE: 77 MMHG | HEIGHT: 71 IN | HEART RATE: 65 BPM | WEIGHT: 190 LBS | SYSTOLIC BLOOD PRESSURE: 132 MMHG | BODY MASS INDEX: 26.6 KG/M2

## 2025-03-27 DIAGNOSIS — M43.10 SPONDYLOLISTHESIS, SITE UNSPECIFIED: ICD-10-CM

## 2025-03-27 DIAGNOSIS — M43.06 SPONDYLOLYSIS, LUMBAR REGION: ICD-10-CM

## 2025-03-27 DIAGNOSIS — Z98.1 ARTHRODESIS STATUS: ICD-10-CM

## 2025-03-27 PROCEDURE — 99214 OFFICE O/P EST MOD 30 MIN: CPT

## 2025-03-27 RX ORDER — TRAMADOL HYDROCHLORIDE 50 MG/1
50 TABLET, COATED ORAL
Qty: 30 | Refills: 0 | Status: ACTIVE | COMMUNITY
Start: 2025-03-27 | End: 1900-01-01

## 2025-03-27 RX ORDER — PREDNISONE 10 MG/1
TABLET ORAL
Refills: 0 | Status: ACTIVE | COMMUNITY

## 2025-04-04 ENCOUNTER — APPOINTMENT (OUTPATIENT)
Dept: CARDIOLOGY | Facility: CLINIC | Age: 50
End: 2025-04-04
Payer: COMMERCIAL

## 2025-04-04 ENCOUNTER — NON-APPOINTMENT (OUTPATIENT)
Age: 50
End: 2025-04-04

## 2025-04-04 VITALS
WEIGHT: 175 LBS | HEIGHT: 71 IN | SYSTOLIC BLOOD PRESSURE: 116 MMHG | BODY MASS INDEX: 24.5 KG/M2 | OXYGEN SATURATION: 96 % | DIASTOLIC BLOOD PRESSURE: 66 MMHG | HEART RATE: 74 BPM

## 2025-04-04 DIAGNOSIS — E78.5 HYPERLIPIDEMIA, UNSPECIFIED: ICD-10-CM

## 2025-04-04 DIAGNOSIS — I25.10 ATHEROSCLEROTIC HEART DISEASE OF NATIVE CORONARY ARTERY W/OUT ANGINA PECTORIS: ICD-10-CM

## 2025-04-04 DIAGNOSIS — R55 SYNCOPE AND COLLAPSE: ICD-10-CM

## 2025-04-04 PROCEDURE — 93000 ELECTROCARDIOGRAM COMPLETE: CPT

## 2025-04-04 PROCEDURE — G2211 COMPLEX E/M VISIT ADD ON: CPT

## 2025-04-04 PROCEDURE — 99214 OFFICE O/P EST MOD 30 MIN: CPT

## 2025-04-21 ENCOUNTER — APPOINTMENT (OUTPATIENT)
Dept: CARDIOLOGY | Facility: CLINIC | Age: 50
End: 2025-04-21
Payer: COMMERCIAL

## 2025-04-21 ENCOUNTER — NON-APPOINTMENT (OUTPATIENT)
Age: 50
End: 2025-04-21

## 2025-04-21 PROCEDURE — 93298 REM INTERROG DEV EVAL SCRMS: CPT

## 2025-04-23 ENCOUNTER — APPOINTMENT (OUTPATIENT)
Dept: NEUROSURGERY | Facility: CLINIC | Age: 50
End: 2025-04-23
Payer: COMMERCIAL

## 2025-04-23 DIAGNOSIS — Z98.1 ARTHRODESIS STATUS: ICD-10-CM

## 2025-04-23 PROCEDURE — 99212 OFFICE O/P EST SF 10 MIN: CPT

## 2025-05-14 ENCOUNTER — APPOINTMENT (OUTPATIENT)
Dept: NEUROSURGERY | Facility: HOSPITAL | Age: 50
End: 2025-05-14

## 2025-05-14 ENCOUNTER — RESULT REVIEW (OUTPATIENT)
Age: 50
End: 2025-05-14

## 2025-05-14 ENCOUNTER — TRANSCRIPTION ENCOUNTER (OUTPATIENT)
Age: 50
End: 2025-05-14

## 2025-05-16 RX ORDER — METHOCARBAMOL 750 MG/1
750 TABLET, FILM COATED ORAL 3 TIMES DAILY
Qty: 21 | Refills: 0 | Status: ACTIVE | COMMUNITY
Start: 2025-05-16 | End: 1900-01-01

## 2025-05-16 RX ORDER — OXYCODONE AND ACETAMINOPHEN 5; 325 MG/1; MG/1
5-325 TABLET ORAL
Qty: 42 | Refills: 0 | Status: ACTIVE | COMMUNITY
Start: 2025-05-16 | End: 1900-01-01

## 2025-05-21 ENCOUNTER — APPOINTMENT (OUTPATIENT)
Dept: NEUROSURGERY | Facility: CLINIC | Age: 50
End: 2025-05-21
Payer: COMMERCIAL

## 2025-05-21 VITALS
WEIGHT: 175 LBS | HEART RATE: 80 BPM | DIASTOLIC BLOOD PRESSURE: 83 MMHG | HEIGHT: 71 IN | SYSTOLIC BLOOD PRESSURE: 130 MMHG | BODY MASS INDEX: 24.5 KG/M2

## 2025-05-21 DIAGNOSIS — Z98.1 ARTHRODESIS STATUS: ICD-10-CM

## 2025-05-21 PROCEDURE — 99024 POSTOP FOLLOW-UP VISIT: CPT

## 2025-05-21 RX ORDER — METHOCARBAMOL 750 MG/1
750 TABLET, FILM COATED ORAL 3 TIMES DAILY
Qty: 21 | Refills: 0 | Status: ACTIVE | COMMUNITY
Start: 2025-05-21 | End: 1900-01-01

## 2025-05-21 RX ORDER — OXYCODONE AND ACETAMINOPHEN 5; 325 MG/1; MG/1
5-325 TABLET ORAL
Qty: 42 | Refills: 0 | Status: ACTIVE | COMMUNITY
Start: 2025-05-21 | End: 1900-01-01

## 2025-05-21 RX ORDER — GABAPENTIN 300 MG/1
300 CAPSULE ORAL TWICE DAILY
Qty: 60 | Refills: 0 | Status: ACTIVE | COMMUNITY
Start: 2025-05-21 | End: 1900-01-01

## 2025-05-22 ENCOUNTER — NON-APPOINTMENT (OUTPATIENT)
Age: 50
End: 2025-05-22

## 2025-05-22 ENCOUNTER — APPOINTMENT (OUTPATIENT)
Dept: CARDIOLOGY | Facility: CLINIC | Age: 50
End: 2025-05-22
Payer: COMMERCIAL

## 2025-05-22 PROCEDURE — 93298 REM INTERROG DEV EVAL SCRMS: CPT

## 2025-06-03 RX ORDER — OXYCODONE AND ACETAMINOPHEN 5; 325 MG/1; MG/1
5-325 TABLET ORAL
Qty: 42 | Refills: 0 | Status: ACTIVE | COMMUNITY
Start: 2025-06-03 | End: 1900-01-01

## 2025-06-17 RX ORDER — OXYCODONE AND ACETAMINOPHEN 5; 325 MG/1; MG/1
5-325 TABLET ORAL EVERY 6 HOURS
Qty: 28 | Refills: 0 | Status: ACTIVE | COMMUNITY
Start: 2025-06-17 | End: 1900-01-01

## 2025-06-24 ENCOUNTER — APPOINTMENT (OUTPATIENT)
Dept: NEUROSURGERY | Facility: CLINIC | Age: 50
End: 2025-06-24
Payer: COMMERCIAL

## 2025-06-24 PROCEDURE — 99024 POSTOP FOLLOW-UP VISIT: CPT

## 2025-06-24 RX ORDER — OXYCODONE AND ACETAMINOPHEN 5; 325 MG/1; MG/1
5-325 TABLET ORAL EVERY 6 HOURS
Qty: 28 | Refills: 0 | Status: ACTIVE | COMMUNITY
Start: 2025-06-24 | End: 1900-01-01

## 2025-06-24 RX ORDER — NAPROXEN 500 MG/1
500 TABLET ORAL
Qty: 60 | Refills: 0 | Status: ACTIVE | COMMUNITY
Start: 2025-06-24 | End: 1900-01-01

## 2025-06-26 ENCOUNTER — APPOINTMENT (OUTPATIENT)
Dept: CARDIOLOGY | Facility: CLINIC | Age: 50
End: 2025-06-26

## 2025-06-26 ENCOUNTER — NON-APPOINTMENT (OUTPATIENT)
Age: 50
End: 2025-06-26

## 2025-06-26 PROCEDURE — 93298 REM INTERROG DEV EVAL SCRMS: CPT

## 2025-07-31 ENCOUNTER — APPOINTMENT (OUTPATIENT)
Dept: CARDIOLOGY | Facility: CLINIC | Age: 50
End: 2025-07-31

## 2025-08-11 RX ORDER — TRAMADOL HYDROCHLORIDE 50 MG/1
50 TABLET, COATED ORAL
Qty: 30 | Refills: 0 | Status: ACTIVE | COMMUNITY
Start: 2025-07-21 | End: 1900-01-01

## 2025-08-12 ENCOUNTER — APPOINTMENT (OUTPATIENT)
Dept: NEUROSURGERY | Facility: CLINIC | Age: 50
End: 2025-08-12
Payer: COMMERCIAL

## 2025-08-12 DIAGNOSIS — Z98.1 ARTHRODESIS STATUS: ICD-10-CM

## 2025-08-12 PROCEDURE — 99024 POSTOP FOLLOW-UP VISIT: CPT

## 2025-08-26 ENCOUNTER — APPOINTMENT (OUTPATIENT)
Dept: CARDIOLOGY | Facility: CLINIC | Age: 50
End: 2025-08-26